# Patient Record
Sex: FEMALE | Race: WHITE | Employment: FULL TIME | URBAN - NONMETROPOLITAN AREA
[De-identification: names, ages, dates, MRNs, and addresses within clinical notes are randomized per-mention and may not be internally consistent; named-entity substitution may affect disease eponyms.]

---

## 2024-05-08 ENCOUNTER — OFFICE VISIT (OUTPATIENT)
Dept: OBGYN CLINIC | Age: 64
End: 2024-05-08
Payer: MEDICARE

## 2024-05-08 ENCOUNTER — HOSPITAL ENCOUNTER (OUTPATIENT)
Age: 64
Setting detail: SPECIMEN
Discharge: HOME | End: 2024-05-08

## 2024-05-08 VITALS — WEIGHT: 159 LBS | DIASTOLIC BLOOD PRESSURE: 60 MMHG | BODY MASS INDEX: 29.08 KG/M2 | SYSTOLIC BLOOD PRESSURE: 112 MMHG

## 2024-05-08 DIAGNOSIS — Z01.419 WOMEN'S ANNUAL ROUTINE GYNECOLOGICAL EXAMINATION: Primary | ICD-10-CM

## 2024-05-08 PROCEDURE — G0101 CA SCREEN;PELVIC/BREAST EXAM: HCPCS | Performed by: OBSTETRICS & GYNECOLOGY

## 2024-05-08 ASSESSMENT — ENCOUNTER SYMPTOMS
ABDOMINAL PAIN: 0
DIARRHEA: 0
SHORTNESS OF BREATH: 0
CONSTIPATION: 0

## 2024-05-08 NOTE — PROGRESS NOTES
SECTION      COLONOSCOPY  2016    FOOT SURGERY Right     OVARIAN CYST REMOVAL Right 2020    RIGHT OVARIAN CYSTECTOMY LAPAROSCOPIC, DI performed by Zhane Devries DO at [facility] OR    OVARY REMOVAL Right 2020       Family History   Problem Relation Age of Onset    Breast Cancer Paternal Grandmother     Cancer Maternal Grandmother         pancreatic    Cancer Sister         leukemia       Chief Complaint   Patient presents with    Annual Exam     Patient denies concerns.           PE:  Vital Signs  Blood pressure 112/60, weight 72.1 kg (159 lb), not currently breastfeeding.  Estimated body mass index is 29.08 kg/m² as calculated from the following:    Height as of 23: 1.575 m (5' 2\").    Weight as of this encounter: 72.1 kg (159 lb).    NURSE: Karolina DYSON    HPI: Patient presents today for annual exam. Feeling well, voices no concerns. Denies breast/pelvic pain. Negative pap. Mammogram. Wellness reviewed.        Review of Systems   Constitutional:  Negative for chills, fatigue and fever.   Respiratory:  Negative for shortness of breath.    Cardiovascular:  Negative for chest pain.   Gastrointestinal:  Negative for abdominal pain, constipation and diarrhea.   Genitourinary:  Negative for dysuria, enuresis, frequency, menstrual problem, pelvic pain, urgency and vaginal bleeding.   Neurological:  Negative for dizziness, light-headedness and headaches.       Physical Exam  Constitutional:       Appearance: Normal appearance.   Genitourinary:      Vulva, bladder and urethral meatus normal.      Right Labia: No rash or lesions.     Left Labia: No lesions or rash.     No labial fusion noted.      No vaginal discharge.      No vaginal prolapse present.     No vaginal atrophy present.       Right Adnexa: not tender and no mass present.     Left Adnexa: not tender and no mass present.     No cervical motion tenderness, discharge, friability or lesion.      No parametrium nodularity present.

## 2024-05-11 LAB
HPV I/H RISK 4 DNA CVX QL NAA+PROBE: NOT DETECTED
HPV SAMPLE: NORMAL
HPV, INTERPRETATION: NORMAL
HPV16 DNA CVX QL NAA+PROBE: NOT DETECTED
HPV18 DNA CVX QL NAA+PROBE: NOT DETECTED
SPECIMEN DESCRIPTION: NORMAL

## 2024-05-21 LAB — CYTOLOGY REPORT: NORMAL

## 2024-06-25 LAB — MAMMOGRAPHY, EXTERNAL: NORMAL
